# Patient Record
Sex: FEMALE | Race: WHITE | NOT HISPANIC OR LATINO | Employment: FULL TIME | URBAN - METROPOLITAN AREA
[De-identification: names, ages, dates, MRNs, and addresses within clinical notes are randomized per-mention and may not be internally consistent; named-entity substitution may affect disease eponyms.]

---

## 2019-07-26 ENCOUNTER — TELEPHONE (OUTPATIENT)
Dept: OTOLARYNGOLOGY | Facility: CLINIC | Age: 59
End: 2019-07-26

## 2019-07-26 NOTE — TELEPHONE ENCOUNTER
----- Message from Leatha Enamorado MD sent at 7/26/2019  4:27 PM CDT -----  Regarding: RE: Ext Referral  Yes. I can see her.     ----- Message -----  From: Petrona Villalta MA  Sent: 7/26/2019   4:23 PM  To: Leatha Enamorado MD  Subject: FW: Ext Referral                                     ----- Message -----  From: Delmy Bennett RN  Sent: 7/25/2019   5:24 PM  To: [facility] Staff  Subject: FW: Ext Referral                                 This is the pt with the referral for IVANA. Records have been scanned in under media. The pt  informed me she would reach out to pt. Just an FYI that this is the patient I had mentioned in a maxine inbasket.     ----- Message -----  From: Elisha Lucero  Sent: 7/25/2019  10:04 AM  To: Delmy Bennett RN  Subject: RE: Ext Referral                                 Ok, thank you for the info. Dr Marvin did pull in the search so Ill call pt an offer her a provider that it pulls.    Thank you!  Elisha    ----- Message -----  From: Delmy Bennett RN  Sent: 7/25/2019   9:48 AM  To: Elisha Lucero  Subject: RE: Ext Referral                                 Thank you for asking. I checked with Dr. Jimenez, and no, he does not treat or operate for IVANA. Dr. Jennings does and he will be out til November I believe. Dr. Jimenez mentioned possibly Dr. Marvin at [facility]    ----- Message -----  From: Elisha Lucero  Sent: 7/25/2019   9:00 AM  To: Barbara Finch Staff  Subject: Ext Referral                                     Good morning,    I rec'd a referral for this pt and she is requesting to see Dr Jimenez, but her dx is eval for IVANA/snoring, would he even see this? He didn't populate in the search but I just wanted to double check. Marian scanned the referral and records in to media mgr, please advise.    Thanks in advance,  Elisha Lucero  [facility]   Ext [phone]

## 2019-07-26 NOTE — TELEPHONE ENCOUNTER
Left message for patient that I scheduled her a appointment for 08/21/2019 at 1:00 PM and put her on the wait list.If that date is not could to call the office back.